# Patient Record
Sex: MALE | Race: WHITE
[De-identification: names, ages, dates, MRNs, and addresses within clinical notes are randomized per-mention and may not be internally consistent; named-entity substitution may affect disease eponyms.]

---

## 2018-03-29 ENCOUNTER — HOSPITAL ENCOUNTER (OUTPATIENT)
Dept: HOSPITAL 62 - ER | Age: 43
Setting detail: OBSERVATION
LOS: 1 days | Discharge: HOME | End: 2018-03-30
Attending: INTERNAL MEDICINE | Admitting: INTERNAL MEDICINE
Payer: SELF-PAY

## 2018-03-29 DIAGNOSIS — I10: ICD-10-CM

## 2018-03-29 DIAGNOSIS — E66.01: ICD-10-CM

## 2018-03-29 DIAGNOSIS — R42: Primary | ICD-10-CM

## 2018-03-29 LAB
ADD MANUAL DIFF: NO
ALBUMIN SERPL-MCNC: 4.5 G/DL (ref 3.5–5)
ALP SERPL-CCNC: 54 U/L (ref 38–126)
ALT SERPL-CCNC: 41 U/L (ref 21–72)
ANION GAP SERPL CALC-SCNC: 10 MMOL/L (ref 5–19)
APPEARANCE UR: CLEAR
APTT PPP: (no result) S
AST SERPL-CCNC: 27 U/L (ref 17–59)
BASOPHILS # BLD AUTO: 0 10^3/UL (ref 0–0.2)
BASOPHILS NFR BLD AUTO: 0.2 % (ref 0–2)
BILIRUB DIRECT SERPL-MCNC: 0.3 MG/DL (ref 0–0.4)
BILIRUB SERPL-MCNC: 0.4 MG/DL (ref 0.2–1.3)
BILIRUB UR QL STRIP: NEGATIVE
BUN SERPL-MCNC: 11 MG/DL (ref 7–20)
CALCIUM: 9.8 MG/DL (ref 8.4–10.2)
CHLORIDE SERPL-SCNC: 105 MMOL/L (ref 98–107)
CK MB SERPL-MCNC: 0.47 NG/ML (ref ?–4.55)
CO2 SERPL-SCNC: 30 MMOL/L (ref 22–30)
EOSINOPHIL # BLD AUTO: 0.1 10^3/UL (ref 0–0.6)
EOSINOPHIL NFR BLD AUTO: 0.9 % (ref 0–6)
ERYTHROCYTE [DISTWIDTH] IN BLOOD BY AUTOMATED COUNT: 15.2 % (ref 11.5–14)
GLUCOSE SERPL-MCNC: 82 MG/DL (ref 75–110)
GLUCOSE UR STRIP-MCNC: NEGATIVE MG/DL
HCT VFR BLD CALC: 41.9 % (ref 37.9–51)
HGB BLD-MCNC: 13.5 G/DL (ref 13.5–17)
KETONES UR STRIP-MCNC: NEGATIVE MG/DL
LYMPHOCYTES # BLD AUTO: 2.2 10^3/UL (ref 0.5–4.7)
LYMPHOCYTES NFR BLD AUTO: 24.5 % (ref 13–45)
MCH RBC QN AUTO: 25.1 PG (ref 27–33.4)
MCHC RBC AUTO-ENTMCNC: 32.2 G/DL (ref 32–36)
MCV RBC AUTO: 78 FL (ref 80–97)
MONOCYTES # BLD AUTO: 0.4 10^3/UL (ref 0.1–1.4)
MONOCYTES NFR BLD AUTO: 4.8 % (ref 3–13)
NEUTROPHILS # BLD AUTO: 6.3 10^3/UL (ref 1.7–8.2)
NEUTS SEG NFR BLD AUTO: 69.6 % (ref 42–78)
NITRITE UR QL STRIP: NEGATIVE
NT PRO BNP: 67 PG/ML (ref ?–125)
PH UR STRIP: 8 [PH] (ref 5–9)
PLATELET # BLD: 328 10^3/UL (ref 150–450)
POTASSIUM SERPL-SCNC: 4.6 MMOL/L (ref 3.6–5)
PROT SERPL-MCNC: 8.6 G/DL (ref 6.3–8.2)
PROT UR STRIP-MCNC: NEGATIVE MG/DL
RBC # BLD AUTO: 5.38 10^6/UL (ref 4.35–5.55)
SODIUM SERPL-SCNC: 144.7 MMOL/L (ref 137–145)
SP GR UR STRIP: 1.02
TOTAL CELLS COUNTED % (AUTO): 100 %
TROPONIN I SERPL-MCNC: < 0.012 NG/ML
TROPONIN I SERPL-MCNC: < 0.012 NG/ML
UROBILINOGEN UR-MCNC: NEGATIVE MG/DL (ref ?–2)
WBC # BLD AUTO: 9 10^3/UL (ref 4–10.5)

## 2018-03-29 PROCEDURE — 84484 ASSAY OF TROPONIN QUANT: CPT

## 2018-03-29 PROCEDURE — 83880 ASSAY OF NATRIURETIC PEPTIDE: CPT

## 2018-03-29 PROCEDURE — 71046 X-RAY EXAM CHEST 2 VIEWS: CPT

## 2018-03-29 PROCEDURE — 93306 TTE W/DOPPLER COMPLETE: CPT

## 2018-03-29 PROCEDURE — 83036 HEMOGLOBIN GLYCOSYLATED A1C: CPT

## 2018-03-29 PROCEDURE — 93010 ELECTROCARDIOGRAM REPORT: CPT

## 2018-03-29 PROCEDURE — G0378 HOSPITAL OBSERVATION PER HR: HCPCS

## 2018-03-29 PROCEDURE — 36415 COLL VENOUS BLD VENIPUNCTURE: CPT

## 2018-03-29 PROCEDURE — 99285 EMERGENCY DEPT VISIT HI MDM: CPT

## 2018-03-29 PROCEDURE — 85025 COMPLETE CBC W/AUTO DIFF WBC: CPT

## 2018-03-29 PROCEDURE — 82553 CREATINE MB FRACTION: CPT

## 2018-03-29 PROCEDURE — 84443 ASSAY THYROID STIM HORMONE: CPT

## 2018-03-29 PROCEDURE — 83735 ASSAY OF MAGNESIUM: CPT

## 2018-03-29 PROCEDURE — 84100 ASSAY OF PHOSPHORUS: CPT

## 2018-03-29 PROCEDURE — 80307 DRUG TEST PRSMV CHEM ANLYZR: CPT

## 2018-03-29 PROCEDURE — 80061 LIPID PANEL: CPT

## 2018-03-29 PROCEDURE — 80053 COMPREHEN METABOLIC PANEL: CPT

## 2018-03-29 PROCEDURE — 81001 URINALYSIS AUTO W/SCOPE: CPT

## 2018-03-29 PROCEDURE — 93005 ELECTROCARDIOGRAM TRACING: CPT

## 2018-03-29 RX ADMIN — Medication SCH ML: at 22:43

## 2018-03-29 NOTE — ER DOCUMENT REPORT
ED Medical Screen (RME)





<OCTAVIO NEVILLE - Last Filed: 03/29/18 16:48>





- General


TRAVEL OUTSIDE OF THE [place] IN LAST 30 DAYS: No





<GÓMEZ BRAN - Last Filed: 03/29/18 19:49>





- General


Chief Complaint: Neck Pain >24hrs old


Stated Complaint: DIZZINESS


Time Seen by Provider: 03/29/18 14:05


Notes: 





42-year-old male reports high blood pressure and pain in his neck.  Radiates up 

into his neck.  Has been told he has prediabetes.  Was on some blood pressure 

meshing occasion but has recently stopped it.





I have greeted and performed a rapid initial assessment of this patient.  A 

comprehensive ED assessment and evaluation of the patient, analysis of test 

results and completion of the medical decision making process will be conducted 

by additional ED providers. (GÓMEZ BRAN)





- Related Data


Allergies/Adverse Reactions: 


 





No Known Allergies Allergy (Verified 03/29/18 12:12)


 











Past Medical History





- Social History


Chew tobacco use (# tins/day): Yes


Frequency of alcohol use: None


Drug Abuse: None





- Past Medical History


Cardiac Medical History: Reports: Hx Hypertension


Renal/ Medical History: Denies: Hx Peritoneal Dialysis





<GÓMEZ BRAN - Last Filed: 03/29/18 19:49>





Physical Exam





- Vital signs


Interpretation: Normal





- General


General appearance: Appears well, Alert





- HEENT


Head: Normocephalic, Atraumatic


Eyes: Normal


Pupils: PERRL





- Respiratory


Respiratory status: No respiratory distress


Chest status: Nontender


Breath sounds: Normal


Chest palpation: Normal





- Cardiovascular


Rhythm: Regular


Heart sounds: Normal auscultation


Murmur: No





- Abdominal


Inspection: Normal


Distension: No distension


Bowel sounds: Normal


Tenderness: Nontender


Organomegaly: No organomegaly





- Back


Back: Normal, Nontender





- Extremities


General upper extremity: Normal inspection, Nontender, Normal color, Normal ROM

, Normal temperature


General lower extremity: Normal inspection, Nontender, Normal color, Normal ROM

, Normal temperature, Normal weight bearing.  No: Richelle's sign





- Neurological


Neuro grossly intact: Yes


Cognition: Normal


Orientation: AAOx4


[place] Coma Scale Eye Opening: Spontaneous


[place] Coma Scale Verbal: Oriented


[place] Coma Scale Motor: Obeys Commands


[place] Coma Scale Total: 15


Speech: Normal


Motor strength normal: LUE, RUE, LLE, RLE


Sensory: Normal





- Psychological


Associated symptoms: Normal affect, Normal mood





- Skin


Skin Temperature: Warm


Skin Moisture: Dry


Skin Color: Normal





<GÓMEZ BRAN - Last Filed: 03/29/18 19:49>





- Vital signs


Vitals: 


 











Temp Pulse Resp BP Pulse Ox


 


 98.7 F   69   18   160/93 H  98 


 


 03/29/18 12:23  03/29/18 12:23  03/29/18 12:23  03/29/18 12:23  03/29/18 12:23














Course





- Laboratory


Result Diagrams: 


 03/29/18 14:35





 03/29/18 14:35





<OCTAVIO NEVILLE - Last Filed: 03/29/18 16:48>





- Laboratory


Result Diagrams: 


 03/29/18 14:35





 03/29/18 14:35





<GÓMEZ BRAN - Last Filed: 03/29/18 19:49>





- Vital Signs


Vital signs: 


 











Temp Pulse Resp BP Pulse Ox


 


 98.0 F   94   18   150/88 H  99 


 


 03/29/18 16:47  03/29/18 18:44  03/29/18 12:25  03/29/18 18:44  03/29/18 16:47














- Laboratory


Laboratory results interpreted by me: 


 











  03/29/18 03/29/18





  14:35 14:35


 


MCV  78 L 


 


MCH  25.1 L 


 


RDW  15.2 H 


 


Total Protein   8.6 H














Doctor's Discharge





<OCTAVIO NEVILLE - Last Filed: 03/29/18 16:48>





<GÓMEZ BRAN - Last Filed: 03/29/18 19:49>





- Discharge


Clinical Impression: 


 Near syncope





Condition: Good


Disposition: ADMITTED AS OBSERVATION

## 2018-03-29 NOTE — ER DOCUMENT REPORT
ED General





- General


Chief Complaint: Neck Pain >24hrs old


Stated Complaint: DIZZINESS


Time Seen by Provider: 03/29/18 14:05


Mode of Arrival: Ambulatory


Information source: Patient


Notes: 





42-year-old male with a history of hypertension presents with complaint of 

dizziness.  Patient states that for the last 3 months he has been experiencing 

intermittent episodes of dizziness that he describes as feeling like he may 

faint.  Today he states that he "feels off" and had more episodes than usual.  

Patient denies any associated chest pain, shortness of breath, diaphoresis, 

nausea, abdominal pain, back pain.  He does complain of some numbness along the 

right side of his neck.  He denies any injury to his neck.  He denies being 

seen for this previously.  He did undergo a stress test for his job 9 years ago 

which he reports to be normal.  Patient also reports that he was taken off of 

his blood pressure medication at the end of 2017 by his primary care physician.

  He states since that time he has had these episodes of feeling lightheaded.  

He denies any history of MI, stroke.  He denies any weakness, slurred speech, 

inability to ambulate.  He denies any smoking, alcohol or drug use.


TRAVEL OUTSIDE OF THE [place] IN LAST 30 DAYS: No





- HPI


Onset: Other - Intermittently for 3 months


Onset/Duration: Gradual, Intermittent


Quality of pain: No pain


Severity: None


Pain Level: Denies


Associated symptoms: denies: Chest pain, Fever, Headache, Nausea, Vomiting, 

Sweating, Weakness


Exacerbated by: Denies.  denies: Supine, Sitting, Standing, Movement, Walking, 

Deep breathing


Relieved by: Denies


Similar symptoms previously: Yes


Recently seen / treated by doctor: No





- Related Data


Allergies/Adverse Reactions: 


 





No Known Allergies Allergy (Verified 03/29/18 12:12)


 











Past Medical History





- General


Information source: Patient





- Social History


Smoking Status: Never Smoker


Chew tobacco use (# tins/day): Yes


Frequency of alcohol use: None


Drug Abuse: None


Lives with: Family


Family History: Reviewed & Not Pertinent


Patient has suicidal ideation: No


Patient has homicidal ideation: No





- Past Medical History


Cardiac Medical History: Reports: Hx Hypertension


Renal/ Medical History: Denies: Hx Peritoneal Dialysis





Review of Systems





- Review of Systems


Constitutional: See HPI.  denies: Fever, Malaise, Weakness


EENT: denies: Eye pain, Eye discharge, Blurred vision, Tearing, Double vision, 

Sinus pressure


Cardiovascular: Lightheaded.  denies: Chest pain, Palpitations, Heart racing


Respiratory: denies: Cough


Gastrointestinal: denies: Abdomen distended


Genitourinary: denies: Dysuria


Musculoskeletal: Other - right sided neck numbness


Skin: No symptoms reported


Neurological/Psychological: denies: Confusion, Sensory change, Loss of power, 

Lost consciousness, Headaches





Physical Exam





- Vital signs


Vitals: 


 











Temp Pulse Resp BP Pulse Ox


 


 98.7 F   69   18   160/93 H  98 


 


 03/29/18 12:23  03/29/18 12:23  03/29/18 12:23  03/29/18 12:23  03/29/18 12:23











Interpretation: Normal, Hypertensive





- General


General appearance: Appears well, Alert


In distress: None





- HEENT


Head: Normocephalic, Atraumatic


Eyes: Normal


Extraocular movements intact: Yes


Pupils: PERRL


Pharynx: Normal





- Respiratory


Respiratory status: No respiratory distress


Chest status: Nontender


Breath sounds: Normal


Chest palpation: Normal





- Cardiovascular


Rhythm: Regular


Heart sounds: Normal auscultation


Murmur: No


Pulses: Normal: Radial, Dorsalis pedis





- Abdominal


Inspection: Normal


Distension: No distension


Bowel sounds: Normal


Tenderness: Nontender


Organomegaly: No organomegaly





- Back


Back: Normal, Nontender





- Neurological


Neuro grossly intact: Yes


Cognition: Normal


Orientation: AAOx4


Tolu Coma Scale Eye Opening: Spontaneous


[place] Coma Scale Verbal: Oriented


[place] Coma Scale Motor: Obeys Commands


[place] Coma Scale Total: 15


Speech: Normal


Cranial nerves: Normal


Cerebellar coordination: Normal.  No: Gait ataxia, Heel-shin, Finger-nose 

rhombey, Rapid alt. movements, Truncal ataxia


Motor strength normal: LUE, RUE, LLE, RLE


Additional motor exam normals: Equal , Other - Patient ambulated 

independently and without difficulty.  He was able to perform heel to toe 

without difficulty.


Babinski reflex: Normal (flexor plantar)


Sensory: Normal





Course





- Re-evaluation


Re-evalutation: 





03/29/18 17:44


Spoke to patient regarding admission for multiple episodes of near syncope.  He 

is agreeable.  Hospitalist has accepted.  hospitalist is requesting chest x-ray 

and orthostatic vital signs.


03/30/18 00:11


42-year-old male with a history of hypertension presents with complaint of 

multiple episodes of near syncope.  On arrival vitals were reviewed.  Patient 

is mildly hypertensive.  He does not appear toxic or dehydrated.  He is in no 

acute distress.  Patient has a normal physical and neurologic exam and is able 

to ambulate without difficulty.  He denies any associated chest pain, shortness 

of breath, diaphoresis.  Patient's neck numbness is not reproducible on my 

exam.  Because of the patient's history of obesity and hypertension admission 

was recommended for further workup near syncope.  Patient is agreeable to 

admission.








Laboratory











  03/29/18 03/29/18 03/29/18





  14:35 14:35 14:35


 


WBC  9.0  


 


RBC  5.38  


 


Hgb  13.5  


 


Hct  41.9  


 


MCV  78 L  


 


MCH  25.1 L  


 


MCHC  32.2  


 


RDW  15.2 H  


 


Plt Count  328  


 


Seg Neutrophils %  69.6  


 


Lymphocytes %  24.5  


 


Monocytes %  4.8  


 


Eosinophils %  0.9  


 


Basophils %  0.2  


 


Absolute Neutrophils  6.3  


 


Absolute Lymphocytes  2.2  


 


Absolute Monocytes  0.4  


 


Absolute Eosinophils  0.1  


 


Absolute Basophils  0.0  


 


Sodium   144.7 


 


Potassium   4.6 


 


Chloride   105 


 


Carbon Dioxide   30 


 


Anion Gap   10 


 


BUN   11 


 


Creatinine   0.66 


 


Est GFR ( Amer)   > 60 


 


Est GFR (Non-Af Amer)   > 60 


 


Glucose   82 


 


Hemoglobin A1c %    5.7


 


Calcium   9.8 


 


Magnesium   


 


Total Bilirubin   0.4 


 


Direct Bilirubin   0.3 


 


Neonat Total Bilirubin   Not Reportable 


 


Neonat Direct Bilirubin   Not Reportable 


 


Neonat Indirect Bili   Not Reportable 


 


AST   27 


 


ALT   41 


 


Alkaline Phosphatase   54 


 


CK-MB (CK-2)   


 


Troponin I   


 


NT-Pro-B Natriuret Pep   


 


Total Protein   8.6 H 


 


Albumin   4.5 


 


Urine Color   


 


Urine Appearance   


 


Urine pH   


 


Ur Specific Gravity   


 


Urine Protein   


 


Urine Glucose (UA)   


 


Urine Ketones   


 


Urine Blood   


 


Urine Nitrite   


 


Urine Bilirubin   


 


Urine Urobilinogen   


 


Ur Leukocyte Esterase   


 


Urine WBC (Auto)   


 


Urine RBC (Auto)   


 


Squamous Epi Cells Auto   


 


Urine Mucus (Auto)   


 


Urine Ascorbic Acid   














  03/29/18 03/29/18 03/29/18





  14:35 14:35 14:35


 


WBC   


 


RBC   


 


Hgb   


 


Hct   


 


MCV   


 


MCH   


 


MCHC   


 


RDW   


 


Plt Count   


 


Seg Neutrophils %   


 


Lymphocytes %   


 


Monocytes %   


 


Eosinophils %   


 


Basophils %   


 


Absolute Neutrophils   


 


Absolute Lymphocytes   


 


Absolute Monocytes   


 


Absolute Eosinophils   


 


Absolute Basophils   


 


Sodium   


 


Potassium   


 


Chloride   


 


Carbon Dioxide   


 


Anion Gap   


 


BUN   


 


Creatinine   


 


Est GFR ( Amer)   


 


Est GFR (Non-Af Amer)   


 


Glucose   


 


Hemoglobin A1c %   


 


Calcium   


 


Magnesium    1.8


 


Total Bilirubin   


 


Direct Bilirubin   


 


Neonat Total Bilirubin   


 


Neonat Direct Bilirubin   


 


Neonat Indirect Bili   


 


AST   


 


ALT   


 


Alkaline Phosphatase   


 


CK-MB (CK-2)   


 


Troponin I  < 0.012  


 


NT-Pro-B Natriuret Pep  67  


 


Total Protein   


 


Albumin   


 


Urine Color   STRAW 


 


Urine Appearance   CLEAR 


 


Urine pH   8.0 


 


Ur Specific Gravity   1.016 


 


Urine Protein   NEGATIVE 


 


Urine Glucose (UA)   NEGATIVE 


 


Urine Ketones   NEGATIVE 


 


Urine Blood   NEGATIVE 


 


Urine Nitrite   NEGATIVE 


 


Urine Bilirubin   NEGATIVE 


 


Urine Urobilinogen   NEGATIVE 


 


Ur Leukocyte Esterase   NEGATIVE 


 


Urine WBC (Auto)   1 


 


Urine RBC (Auto)   1 


 


Squamous Epi Cells Auto   <1 


 


Urine Mucus (Auto)   RARE 


 


Urine Ascorbic Acid   NEGATIVE 














  03/29/18





  20:30


 


WBC 


 


RBC 


 


Hgb 


 


Hct 


 


MCV 


 


MCH 


 


MCHC 


 


RDW 


 


Plt Count 


 


Seg Neutrophils % 


 


Lymphocytes % 


 


Monocytes % 


 


Eosinophils % 


 


Basophils % 


 


Absolute Neutrophils 


 


Absolute Lymphocytes 


 


Absolute Monocytes 


 


Absolute Eosinophils 


 


Absolute Basophils 


 


Sodium 


 


Potassium 


 


Chloride 


 


Carbon Dioxide 


 


Anion Gap 


 


BUN 


 


Creatinine 


 


Est GFR ( Amer) 


 


Est GFR (Non-Af Amer) 


 


Glucose 


 


Hemoglobin A1c % 


 


Calcium 


 


Magnesium 


 


Total Bilirubin 


 


Direct Bilirubin 


 


Neonat Total Bilirubin 


 


Neonat Direct Bilirubin 


 


Neonat Indirect Bili 


 


AST 


 


ALT 


 


Alkaline Phosphatase 


 


CK-MB (CK-2)  0.47


 


Troponin I  < 0.012


 


NT-Pro-B Natriuret Pep 


 


Total Protein 


 


Albumin 


 


Urine Color 


 


Urine Appearance 


 


Urine pH 


 


Ur Specific Gravity 


 


Urine Protein 


 


Urine Glucose (UA) 


 


Urine Ketones 


 


Urine Blood 


 


Urine Nitrite 


 


Urine Bilirubin 


 


Urine Urobilinogen 


 


Ur Leukocyte Esterase 


 


Urine WBC (Auto) 


 


Urine RBC (Auto) 


 


Squamous Epi Cells Auto 


 


Urine Mucus (Auto) 


 


Urine Ascorbic Acid 














 





Chest X-Ray  03/29/18 17:40


IMPRESSION:  NO SIGNIFICANT RADIOGRAPHIC FINDING IN THE CHEST.


 














- Vital Signs


Vital signs: 


 











Temp Pulse Resp BP Pulse Ox


 


 98.5 F   66   18   111/71   98 


 


 03/29/18 21:04  03/29/18 21:04  03/29/18 21:04  03/29/18 21:04  03/29/18 21:04














- Laboratory


Result Diagrams: 


 03/29/18 14:35





 03/29/18 14:35


Laboratory results interpreted by me: 


 











  03/29/18 03/29/18





  14:35 14:35


 


MCV  78 L 


 


MCH  25.1 L 


 


RDW  15.2 H 


 


Total Protein   8.6 H














- Diagnostic Test


Radiology reviewed: Image reviewed





- EKG Interpretation by Me


EKG shows normal: Sinus rhythm


Rate: Normal


Rhythm: NSR


When compared to previous EKG there are: No significant change





Discharge





- Discharge


Clinical Impression: 


 Near syncope





Condition: Good


Disposition: ADMITTED AS OBSERVATION


Admitting Provider: Hospitalist


Unit Admitted: IMCU

## 2018-03-29 NOTE — RADIOLOGY REPORT (SQ)
EXAM DESCRIPTION:  CHEST PA/LAT



COMPLETED DATE/TIME:  3/29/2018 6:05 pm



REASON FOR STUDY:  near syncope



COMPARISON:  12/8/2017



EXAM PARAMETERS:  NUMBER OF VIEWS: two views

TECHNIQUE: Digital Frontal and Lateral radiographic views of the chest acquired.

RADIATION DOSE: NA

LIMITATIONS: none



FINDINGS:  LUNGS AND PLEURA: No opacities, masses or pneumothorax. No pleural effusion.

MEDIASTINUM AND HILAR STRUCTURES: No masses or contour abnormalities.

HEART AND VASCULAR STRUCTURES: Heart normal size.  No evidence for failure.

BONES: No acute findings.

HARDWARE: None in the chest.

OTHER: No other significant finding.



IMPRESSION:  NO SIGNIFICANT RADIOGRAPHIC FINDING IN THE CHEST.



TECHNICAL DOCUMENTATION:  JOB ID:  [id number]

 2011 [employer]- All Rights Reserved



Reading location - IP/workstation name: [email or web address]

## 2018-03-29 NOTE — EKG REPORT
SEVERITY:- BORDERLINE ECG -

SINUS RHYTHM

PROBABLE LEFT ATRIAL ABNORMALITY

:

Confirmed by: Sonny Carranza MD 29-Mar-2018 18:16:05

## 2018-03-29 NOTE — PDOC H&P
History of Present Illness


Admission Date/PCP: 


  03/29/18 18:20





  ANAYELI ÁLVAREZ MD





History of Present Illness: 


JENNIFER GOMES is a 42 year old male who is obese, his past medical history 

includes hypertension which improved with diet and exercise and he was taken 

off his blood pressure medications approximately a year ago.


He presents to the ER today complaining of presyncope for the past few weeks.


Denies chest pain, or syncope.





Home meds:





Steroid cream for psoriasis


Melatonin


Tylenol prn





Past Medical History


Cardiac Medical History: Reports: Hypertension





Social History


Lives with: Family


Smoking Status: Never Smoker


Frequency of Alcohol Use: Rare


Hx Recreational Drug Use: No


Hx Prescription Drug Abuse: No





Family History


Family History: CAD


Parental Family History Reviewed: Yes


Children Family History Reviewed: Yes


Sibling(s) Family History Reviewed.: Yes





Medication/Allergy


Home Medications: 








No Home Medications  03/29/18 








Allergies/Adverse Reactions: 


 





No Known Allergies Allergy (Verified 03/29/18 12:12)


 











Review of Systems


Constitutional: ABSENT: fever(s)


Eyes: ABSENT: visual disturbances


Ears: ABSENT: hearing changes


Nose, Mouth, and Throat: ABSENT: sore throat


Cardiovascular: ABSENT: dyspnea on exertion, edema, orthropnea, palpitations


Gastrointestinal: ABSENT: heartburn


Genitourinary: ABSENT: dysuria


Musculoskeletal: ABSENT: joint swelling


Neurological: ABSENT: focal weakness


Psychiatric: ABSENT: hallucinations


Endocrine: ABSENT: heat intolerance





Physical Exam


Vital Signs: 


 











Temp Pulse Resp BP Pulse Ox


 


 98.0 F   94   18   150/88 H  99 


 


 03/29/18 16:47  03/29/18 18:44  03/29/18 12:25  03/29/18 18:44  03/29/18 16:47











General appearance: PRESENT: no acute distress, morbidly obese


Head exam: PRESENT: atraumatic, normocephalic


Ear exam: PRESENT: normal external ear exam


Mouth exam: PRESENT: moist, neck supple


Neck exam: ABSENT: tenderness, tracheal deviation


Respiratory exam: PRESENT: clear to auscultation roula, unlabored.  ABSENT: 

wheezes


Cardiovascular exam: PRESENT: RRR


GI/Abdominal exam: PRESENT: normal bowel sounds, soft.  ABSENT: tenderness


Rectal exam: PRESENT: deferred


Neurological exam: PRESENT: alert, awake, oriented to person, oriented to place

, oriented to time


Psychiatric exam: PRESENT: normal mood





Results


Impressions: 


 





Chest X-Ray  03/29/18 17:40


IMPRESSION:  NO SIGNIFICANT RADIOGRAPHIC FINDING IN THE CHEST.


 














Assessment & Plan





- Diagnosis


(1) Near syncope


Is this a current diagnosis for this admission?: Yes   


Plan: 


Telemetry, cardiac enzymes stress test in the am, echocardiogram.


orthostatic BP normal








(2) Hypertension


Qualifiers: 


   Hypertension type: essential hypertension   Qualified Code(s): I10 - 

Essential (primary) hypertension   


Is this a current diagnosis for this admission?: Yes   


Plan: 


Monitor, start antihypertensives in am 








(3) Morbid obesity


Is this a current diagnosis for this admission?: Yes   





- Time


Time Spent: 30 to 50 Minutes

## 2018-03-30 VITALS — SYSTOLIC BLOOD PRESSURE: 123 MMHG | DIASTOLIC BLOOD PRESSURE: 77 MMHG

## 2018-03-30 LAB
BARBITURATES UR QL SCN: NEGATIVE
CHOLEST SERPL-MCNC: 202.44 MG/DL (ref 0–200)
CK MB SERPL-MCNC: 0.45 NG/ML (ref ?–4.55)
CK MB SERPL-MCNC: 0.5 NG/ML (ref ?–4.55)
LDLC SERPL DIRECT ASSAY-MCNC: 117 MG/DL (ref ?–100)
METHADONE UR QL SCN: NEGATIVE
PCP UR QL SCN: NEGATIVE
PHOSPHATE SERPL-MCNC: 3.9 MG/DL (ref 2.5–4.5)
TRIGL SERPL-MCNC: 124 MG/DL (ref ?–150)
TROPONIN I SERPL-MCNC: < 0.012 NG/ML
TROPONIN I SERPL-MCNC: < 0.012 NG/ML
URINE AMPHETAMINES SCREEN: NEGATIVE
URINE BENZODIAZEPINES SCREEN: NEGATIVE
URINE COCAINE SCREEN: NEGATIVE
URINE MARIJUANA (THC) SCREEN: NEGATIVE
VLDLC SERPL CALC-MCNC: 25 MG/DL (ref 10–31)

## 2018-03-30 RX ADMIN — Medication SCH ML: at 06:14

## 2018-03-30 NOTE — XCELERA REPORT
[address] [address]

                             Tel: [phone]

                             Fax: [phone]



                    Transthoracic Echocardiogram Report

____________________________________________________________________________



Name: JENNIFER GOMES

MRN: [id number]                Age: 42 yrs

Gender: Male                   : 1975

Patient Status: Inpatient      Patient Location: [address] [place] [address]

Account #: [id number]

Study Date: 2018 12:36 PM

Accession #: [id number]

____________________________________________________________________________



Height: 71 in        Weight: 359 lb        BSA: 2.7 m2



____________________________________________________________________________

Procedure: A complete two-dimensional transthoracic echocardiogram was

performed (2D, M-mode, spectral and color flow Doppler). The study was

technically difficult with many images being suboptimal in quality.

Reason For Study: presyncope





Ordering Physician: YOANDY VALENTE

Performed By: Keyanna Garza

____________________________________________________________________________





Interpretation Summary

The left ventricular ejection fraction is normal.

There is borderline concentric left ventricular hypertrophy.

The left ventricle is grossly normal size.

Doppler measurements suggest normal left ventricular diastolic function

Wall motion cannot be accurately commented on, but no definite regional

wall motion abnormalities noted.

Borderline right ventricular enlargement.

The right ventricular systolic function is normal.

The left atrium is mildly dilated.

The right atrium is normal in size

There is a trace amount of mitral regurgitation

There is no mitral valve stenosis.

No aortic regurgitation is present.

There is no aortic valve stenosis

There is no tricuspid stenosis.

No tricuspid regurgitation.

The aortic root is not well visualized but is probably normal size.

The inferior vena cava was not well visualized

There is no pericardial effusion.



____________________________________________________________________________



MMode/2D Measurements & Calculations

RVDd: 3.0 cm       LVIDd: 5.7 cmFS: 33.0 %           Ao root diam: 3.5 cm

IVSd: 0.98 cm      LVIDs: 3.8 cmEDV(Teich): 162.2 ml

                   LVPWd: 1.0 cmESV(Teich): 63.5 ml  Ao root area: 9.8 cm2

                                EF(Teich): 60.9 %    LA dimension: 4.1 cm



        _____________________________________________________________

LVOT diam: 2.4 cm

LVOT area: 4.5 cm2





Doppler Measurements & Calculations

MV E max alexey:      MV P1/2t max alexey:    Ao V2 max:       LV V1 max P.3 cm/sec        84.3 cm/sec          156.4 cm/sec     4.2 mmHg

MV A max alexey:      MV P1/2t: 71.2 msec  Ao max PG:       LV V1 max:

69.9 cm/sec        MVA(P1/2t): 3.1 cm2  9.8 mmHg         102.8 cm/sec

MV E/A: 1.2        MV dec slope:        STERLING(V,D): 2.9 cm2

                   347.1 cm/sec2



        _____________________________________________________________

PA V2 max:

108.6 cm/sec

PA max P.7 mmHg



____________________________________________________________________________

Left Ventricle

The left ventricle is grossly normal size. There is borderline concentric

left ventricular hypertrophy. The left ventricular ejection fraction is

normal. Doppler measurements suggest normal left ventricular diastolic

function. Wall motion cannot be accurately commented on, but no definite

regional wall motion abnormalities noted.



Right Ventricle

Borderline right ventricular enlargement. There is normal right ventricular

wall thickness. The right ventricular systolic function is normal.



Atria

The right atrium is normal in size. The left atrium is mildly dilated.

Interarterial septum not well visualized and not well dopplered. Cannot

comment on ASD/PFO presence.





Mitral Valve

The mitral valve is grossly normal. There is no mitral valve stenosis.

There is a trace amount of mitral regurgitation.



Aortic Valve

The aortic valve is grossly normal. There is no aortic valve stenosis. No

aortic regurgitation is present.



Tricuspid Valve

The tricuspid valve is not well visualized, but is grossly normal. There is

no tricuspid stenosis. No tricuspid regurgitation.



Pulmonic Valve

The pulmonic valve is not well visualized.



Great Vessels

The aortic root is not well visualized but is probably normal size. The

inferior vena cava was not well visualized.





Effusions

There is no pericardial effusion.



____________________________________________________________________________



Electronically signed by:      Jennifer Thomason      on 2018 05:46 PM



CC: AMBROSIO MAY

>

Jennifer Thomason

## 2018-03-30 NOTE — PDOC DISCHARGE SUMMARY
General





- Admit/Disc Date/PCP


Admission Date/Primary Care Provider: 


  03/29/18 18:20





  ANAYELI ÁLVAREZ MD





Discharge Date: 03/30/18





- Discharge Diagnosis


(1) Near syncope


Is this a current diagnosis for this admission?: Yes   


Summary: 


Normal orthostatic BP, normal cardiac enzymes. Echo within normal limits.


Stress test as outpatient.


Follow up PCP in 1 week








(2) Hypertension


Is this a current diagnosis for this admission?: Yes   


Summary: 


BP acceptable. Instructed to monitor BP at home and follow up with PCP








(3) Morbid obesity


Is this a current diagnosis for this admission?: Yes   


Summary: 


Calorie control and exercise.








- Additional Information


Resuscitation Status: Full Code


Discharge Diet: Cardiac


Discharge Activity: Activity As Tolerated


Home Medications: 








No Home Medications  03/29/18 











History of Present Illness


History of Present Illness: 


JENNIFER GOMES is a 42 year old male who is obese, his past medical history 

includes hypertension which improved with diet and exercise and he was taken 

off his blood pressure medications approximately a year ago.


He presents to the ER today complaining of presyncope for the past few weeks.


Denies chest pain, or syncope.


Admits to a lot of stress at work


No fever/ chills/cough/ vomiting/diarrhea





Home meds:





Steroid cream for psoriasis


Melatonin


Tylenol prn





Cardiac enzymes, EKG, echo, orthostatic BP normal.


Ok for discharge.


Feeling better.


Stress test as outpatient.





Physical Exam


Vital Signs: 


 











Temp Pulse Resp BP Pulse Ox


 


 97.5 F   87   16   123/77   98 


 


 03/30/18 16:50  03/30/18 16:50  03/30/18 16:50  03/30/18 16:50  03/30/18 16:50








 Intake & Output











 03/29/18 03/30/18 03/31/18





 06:59 06:59 06:59


 


Intake Total  572 442


 


Output Total   275


 


Balance  572 167


 


Weight  162.84 kg 











General appearance: PRESENT: no acute distress, morbidly obese


Ear exam: PRESENT: normal external ear exam


Respiratory exam: PRESENT: clear to auscultation roula, unlabored


Cardiovascular exam: PRESENT: RRR





Results


Laboratory Results: 


 











  03/30/18 03/30/18





  09:29 09:29


 


Phosphorus  3.9 


 


Magnesium  1.8 


 


Triglycerides  124 


 


Cholesterol  202.44 H 


 


LDL Cholesterol Direct  117 H 


 


VLDL Cholesterol  25.0 


 


HDL Cholesterol  47 


 


TSH   1.31








 











  03/29/18 03/30/18 03/30/18





  20:30 02:45 09:29


 


CK-MB (CK-2)  0.47  0.45  0.50


 


Troponin I  < 0.012  < 0.012  < 0.012











Impressions: 


 





Chest X-Ray  03/29/18 17:40


IMPRESSION:  NO SIGNIFICANT RADIOGRAPHIC FINDING IN THE CHEST.


 














Qualifiers





- *


**PATEINT BEING DISCHARGED WITH ANY OF THE FOLLOWING DIAGNOSIS?: No





Plan


Time Spent: Greater than 30 Minutes

## 2019-09-24 ENCOUNTER — HOSPITAL ENCOUNTER (EMERGENCY)
Dept: HOSPITAL 62 - ER | Age: 44
LOS: 1 days | Discharge: HOME | End: 2019-09-25
Payer: SELF-PAY

## 2019-09-24 DIAGNOSIS — I10: ICD-10-CM

## 2019-09-24 DIAGNOSIS — R07.89: Primary | ICD-10-CM

## 2019-09-24 LAB
ADD MANUAL DIFF: NO
ALBUMIN SERPL-MCNC: 4.4 G/DL (ref 3.5–5)
ALP SERPL-CCNC: 60 U/L (ref 38–126)
ANION GAP SERPL CALC-SCNC: 8 MMOL/L (ref 5–19)
AST SERPL-CCNC: 33 U/L (ref 17–59)
BASOPHILS # BLD AUTO: 0 10^3/UL (ref 0–0.2)
BASOPHILS NFR BLD AUTO: 0.3 % (ref 0–2)
BILIRUB DIRECT SERPL-MCNC: 0.1 MG/DL (ref 0–0.4)
BILIRUB SERPL-MCNC: 0.4 MG/DL (ref 0.2–1.3)
BUN SERPL-MCNC: 13 MG/DL (ref 7–20)
CALCIUM: 9.4 MG/DL (ref 8.4–10.2)
CHLORIDE SERPL-SCNC: 104 MMOL/L (ref 98–107)
CK MB SERPL-MCNC: 0.5 NG/ML (ref ?–4.55)
CK SERPL-CCNC: 156 U/L (ref 55–170)
CO2 SERPL-SCNC: 30 MMOL/L (ref 22–30)
EOSINOPHIL # BLD AUTO: 0.1 10^3/UL (ref 0–0.6)
EOSINOPHIL NFR BLD AUTO: 1.7 % (ref 0–6)
ERYTHROCYTE [DISTWIDTH] IN BLOOD BY AUTOMATED COUNT: 14.9 % (ref 11.5–14)
GLUCOSE SERPL-MCNC: 90 MG/DL (ref 75–110)
HCT VFR BLD CALC: 40.3 % (ref 37.9–51)
HGB BLD-MCNC: 13.2 G/DL (ref 13.5–17)
LYMPHOCYTES # BLD AUTO: 3.1 10^3/UL (ref 0.5–4.7)
LYMPHOCYTES NFR BLD AUTO: 34.4 % (ref 13–45)
MCH RBC QN AUTO: 25.6 PG (ref 27–33.4)
MCHC RBC AUTO-ENTMCNC: 32.7 G/DL (ref 32–36)
MCV RBC AUTO: 78 FL (ref 80–97)
MONOCYTES # BLD AUTO: 0.7 10^3/UL (ref 0.1–1.4)
MONOCYTES NFR BLD AUTO: 7.4 % (ref 3–13)
NEUTROPHILS # BLD AUTO: 5 10^3/UL (ref 1.7–8.2)
NEUTS SEG NFR BLD AUTO: 56.2 % (ref 42–78)
PLATELET # BLD: 317 10^3/UL (ref 150–450)
POTASSIUM SERPL-SCNC: 5 MMOL/L (ref 3.6–5)
PROT SERPL-MCNC: 8 G/DL (ref 6.3–8.2)
RBC # BLD AUTO: 5.14 10^6/UL (ref 4.35–5.55)
TOTAL CELLS COUNTED % (AUTO): 100 %
TROPONIN I SERPL-MCNC: < 0.012 NG/ML
WBC # BLD AUTO: 8.9 10^3/UL (ref 4–10.5)

## 2019-09-24 PROCEDURE — 85025 COMPLETE CBC W/AUTO DIFF WBC: CPT

## 2019-09-24 PROCEDURE — 93005 ELECTROCARDIOGRAM TRACING: CPT

## 2019-09-24 PROCEDURE — 82550 ASSAY OF CK (CPK): CPT

## 2019-09-24 PROCEDURE — 82553 CREATINE MB FRACTION: CPT

## 2019-09-24 PROCEDURE — 99284 EMERGENCY DEPT VISIT MOD MDM: CPT

## 2019-09-24 PROCEDURE — 36415 COLL VENOUS BLD VENIPUNCTURE: CPT

## 2019-09-24 PROCEDURE — 71046 X-RAY EXAM CHEST 2 VIEWS: CPT

## 2019-09-24 PROCEDURE — 84484 ASSAY OF TROPONIN QUANT: CPT

## 2019-09-24 PROCEDURE — 80053 COMPREHEN METABOLIC PANEL: CPT

## 2019-09-24 PROCEDURE — 93010 ELECTROCARDIOGRAM REPORT: CPT

## 2019-09-24 NOTE — ER DOCUMENT REPORT
ED Medical Screen (RME)





- General


Chief Complaint: Chest Pain > 30


Stated Complaint: CHEST PAIN,DIZZINESS


Time Seen by Provider: 09/24/19 22:09


Primary Care Provider: 


ANAYELI ÁLVAREZ MD [Primary Care Provider] - Follow up as needed


Mode of Arrival: Ambulatory


Information source: Patient


Notes: 





This 43-year-old male presents emergency department with chest pain since last 

Wednesday.  Reports he is getting worse he can hardly sleep last night reports 

he been sweating.  Patient does have a history of high blood pressure but he 

lost weight when off his meds recently he is gained weight back.  He also 

reports that he has been working out.  Reports chest wall was tender to palpate.

 Patient reports this chest pain is different from pain from working out.  

Denies history of cardiac disease denies diabetes.  EKG sinus rhythm.











I have greeted and performed a rapid initial assessment of this patient.  A 

comprehensive ED assessment and evaluation of the patient, analysis of test 

results and completion of the medical decision making process will be conducted 

by additional ED providers.


Dictation of this chart was performed using voice recognition software; ther

efore, there may be some unintended grammatical errors.


TRAVEL OUTSIDE OF THE [place] IN LAST 30 DAYS: No





- Related Data


Allergies/Adverse Reactions: 


                                        





No Known Allergies Allergy (Verified 03/29/18 12:12)


   











Past Medical History





- Social History


Chew tobacco use (# tins/day): No


Frequency of alcohol use: Rare


Drug Abuse: Marijuana





- Past Medical History


Cardiac Medical History: Reports: Hx Hypertension


Renal/ Medical History: Denies: Hx Peritoneal Dialysis


Psychiatric Medical History: 


   Denies: Hx Depression





- Immunizations


History of Influenza Vaccine for 10/2017 - 3/2018 Season: Refused





Physical Exam





- Vital signs


Vitals: 





                                        











Temp Pulse Resp BP Pulse Ox


 


 99.1 F   73   16   135/84 H  98 


 


 09/24/19 22:01  09/24/19 22:01  09/24/19 22:01  09/24/19 22:01  09/24/19 22:01














Course





- Vital Signs


Vital signs: 





                                        











Temp Pulse Resp BP Pulse Ox


 


 99.1 F   73   16   135/84 H  98 


 


 09/24/19 22:01  09/24/19 22:01  09/24/19 22:01  09/24/19 22:01  09/24/19 22:01














Doctor's Discharge





- Discharge


Referrals: 


ANAYELI ÁLVAREZ MD [Primary Care Provider] - Follow up as needed

## 2019-09-24 NOTE — RADIOLOGY REPORT (SQ)
XR CHEST 2 VIEWS 



CLINICAL STATEMENT: cp



COMPARISON:  3/29/2018



FINDINGS:  Cardiomediastinal silhouette is within normal limits.

There is no focal lung consolidation or pleural effusion. No

evidence of pulmonary edema or pneumothorax.



IMPRESSION:



No acute cardiopulmonary disease.

## 2019-09-25 VITALS — DIASTOLIC BLOOD PRESSURE: 94 MMHG | SYSTOLIC BLOOD PRESSURE: 150 MMHG

## 2019-09-25 NOTE — ER DOCUMENT REPORT
Doctor's Note


Notes: 





09/25/19 00:13


I picked up patient's chart but nurse states patient never answered when she 

called in the waiting room.  Apparently patient eloped after he was seen at 

triage by the advanced practice clinician.  His EKG does not show any acute 

changes.  His chest x-ray is unremarkable.  His lab work also shows no significa

nt acute abnormalities.